# Patient Record
Sex: FEMALE | Race: WHITE | URBAN - METROPOLITAN AREA
[De-identification: names, ages, dates, MRNs, and addresses within clinical notes are randomized per-mention and may not be internally consistent; named-entity substitution may affect disease eponyms.]

---

## 2023-01-31 ENCOUNTER — WEB ENCOUNTER (OUTPATIENT)
Dept: URBAN - METROPOLITAN AREA CLINIC 19 | Facility: CLINIC | Age: 63
End: 2023-01-31

## 2023-02-02 ENCOUNTER — LAB OUTSIDE AN ENCOUNTER (OUTPATIENT)
Dept: URBAN - METROPOLITAN AREA CLINIC 19 | Facility: CLINIC | Age: 63
End: 2023-02-02

## 2023-02-02 ENCOUNTER — OFFICE VISIT (OUTPATIENT)
Dept: URBAN - METROPOLITAN AREA CLINIC 19 | Facility: CLINIC | Age: 63
End: 2023-02-02
Payer: SELF-PAY

## 2023-02-02 ENCOUNTER — WEB ENCOUNTER (OUTPATIENT)
Dept: URBAN - METROPOLITAN AREA CLINIC 19 | Facility: CLINIC | Age: 63
End: 2023-02-02

## 2023-02-02 DIAGNOSIS — N39.0 URINARY TRACT INFECTION WITHOUT HEMATURIA, SITE UNSPECIFIED: ICD-10-CM

## 2023-02-02 DIAGNOSIS — R10.32 LLQ ABDOMINAL PAIN: ICD-10-CM

## 2023-02-02 DIAGNOSIS — R30.0 DYSURIA: ICD-10-CM

## 2023-02-02 DIAGNOSIS — R19.7 DIARRHEA OF PRESUMED INFECTIOUS ORIGIN: ICD-10-CM

## 2023-02-02 PROCEDURE — 99204 OFFICE O/P NEW MOD 45 MIN: CPT | Performed by: NURSE PRACTITIONER

## 2023-02-02 RX ORDER — TOPIRAMATE 50 MG/1
1 TABLET TABLET, FILM COATED ORAL ONCE A DAY
Status: ACTIVE | COMMUNITY

## 2023-02-02 RX ORDER — ESTRADIOL 2 MG/1
1 TABLET TABLET ORAL ONCE A DAY
Status: ACTIVE | COMMUNITY

## 2023-02-02 RX ORDER — SULFAMETHOXAZOLE AND TRIMETHOPRIM 800; 160 MG/1; MG/1
1 TABLET TABLET ORAL TWICE A DAY
Qty: 14 TABLET | Refills: 0 | OUTPATIENT
Start: 2023-02-02 | End: 2023-02-09

## 2023-02-02 NOTE — PHYSICAL EXAM GASTROINTESTINAL
Abdomen, soft, LLQ tender, nondistended, normal bowel sounds, Liver and Spleen, no hepatomegaly present

## 2023-02-02 NOTE — HPI-TODAY'S VISIT:
62-year-old female presents to the office for abdominal pain nausea and diarrhea. She was seen in the emergency room 1/26/2023 for diarrhea x5 days, nausea, abdominal cramping. LABS  CBC normal except for slightly low WBC at 3.8, CMP normal, urine culture showed E. coli. She was not placed on antibiotics, was not informed she had a UTI. She complains of diarrhea that lasted 6 days, resolved at this time. Having 1 formed stool/day now. Denies blood in stools. She continues to have nausea intermittently for the last week that is improving. She complains of LLQ abdominal pain for 2 months, constant, dull ache, nothing makes it worse/better.  Denies GERD, dysphagia, wt loss.  Her urine does have a horrible odor.   HX H pylori 8 years ago Hx of parasites, ecoli while drinking well water 5 years ago Last Colonoscopy was 5 years ago, [facility], no polyps. Denies family hx of colon cancer.

## 2023-02-03 ENCOUNTER — LAB OUTSIDE AN ENCOUNTER (OUTPATIENT)
Dept: URBAN - METROPOLITAN AREA CLINIC 19 | Facility: CLINIC | Age: 63
End: 2023-02-03

## 2023-02-03 ENCOUNTER — TELEPHONE ENCOUNTER (OUTPATIENT)
Dept: URBAN - METROPOLITAN AREA CLINIC 19 | Facility: CLINIC | Age: 63
End: 2023-02-03

## 2023-02-09 LAB
ADENOVIRUS F 40/41: NOT DETECTED
CAMPYLOBACTER: NOT DETECTED
CLOSTRIDIUM DIFFICILE: NOT DETECTED
CRYPTOSPORIDIUM: NOT DETECTED
ENTAMOEBA HISTOLYTICA: NOT DETECTED
ENTEROAGGREGATIVE E.COLI: NOT DETECTED
ENTEROTOXIGENIC E.COLI: NOT DETECTED
ESCHERICHIA COLI O157: NOT DETECTED
GIARDIA LAMBLIA: NOT DETECTED
H. PYLORI (EIA) - QDX: NEGATIVE
NOROVIRUS GI/GII: NOT DETECTED
ROTAVIRUS A: NOT DETECTED
SALMONELLA SPP.: NOT DETECTED
SHIGA-LIKE TOXIN PRODUCING E.COLI: NOT DETECTED
SHIGELLA SPP. / ENTEROINVASIVE E.COLI: NOT DETECTED
VIBRIO PARAHAEMOLYTICUS: NOT DETECTED
VIBRIO SPP.: NOT DETECTED
YERSINIA ENTEROCOLITICA: NOT DETECTED

## 2023-02-17 ENCOUNTER — TELEPHONE ENCOUNTER (OUTPATIENT)
Dept: URBAN - METROPOLITAN AREA CLINIC 19 | Facility: CLINIC | Age: 63
End: 2023-02-17

## 2023-02-17 RX ORDER — HYOSCYAMINE SULFATE 0.12 MG/1
1 TABLET UNDER THE TONGUE AND ALLOW TO DISSOLVE  AS NEEDED TABLET SUBLINGUAL THREE TIMES A DAY
Qty: 60 | Refills: 2 | OUTPATIENT
Start: 2023-02-17 | End: 2023-05-18

## 2023-03-16 ENCOUNTER — OFFICE VISIT (OUTPATIENT)
Dept: URBAN - METROPOLITAN AREA CLINIC 19 | Facility: CLINIC | Age: 63
End: 2023-03-16

## 2023-04-21 ENCOUNTER — LAB OUTSIDE AN ENCOUNTER (OUTPATIENT)
Dept: URBAN - METROPOLITAN AREA CLINIC 96 | Facility: CLINIC | Age: 63
End: 2023-04-21

## 2023-04-21 ENCOUNTER — DASHBOARD ENCOUNTERS (OUTPATIENT)
Age: 63
End: 2023-04-21

## 2023-04-21 ENCOUNTER — OFFICE VISIT (OUTPATIENT)
Dept: URBAN - METROPOLITAN AREA CLINIC 96 | Facility: CLINIC | Age: 63
End: 2023-04-21
Payer: SELF-PAY

## 2023-04-21 VITALS
SYSTOLIC BLOOD PRESSURE: 118 MMHG | BODY MASS INDEX: 26.05 KG/M2 | HEIGHT: 63 IN | TEMPERATURE: 98.2 F | DIASTOLIC BLOOD PRESSURE: 67 MMHG | WEIGHT: 147 LBS

## 2023-04-21 DIAGNOSIS — R10.32 LLQ ABDOMINAL PAIN: ICD-10-CM

## 2023-04-21 DIAGNOSIS — E53.8 B12 DEFICIENCY: ICD-10-CM

## 2023-04-21 DIAGNOSIS — K59.00 CONSTIPATION, UNSPECIFIED CONSTIPATION TYPE: ICD-10-CM

## 2023-04-21 PROBLEM — 14760008: Status: ACTIVE | Noted: 2023-04-21

## 2023-04-21 PROCEDURE — 99214 OFFICE O/P EST MOD 30 MIN: CPT

## 2023-04-21 RX ORDER — TOPIRAMATE 50 MG/1
1 TABLET TABLET, FILM COATED ORAL ONCE A DAY
Status: ACTIVE | COMMUNITY

## 2023-04-21 RX ORDER — CYANOCOBALAMIN (VITAMIN B-12) 2000 MCG
1 TABLET TABLET ORAL ONCE A DAY
Status: ACTIVE | COMMUNITY

## 2023-04-21 RX ORDER — HYOSCYAMINE SULFATE 0.12 MG/1
1 TABLET UNDER THE TONGUE AND ALLOW TO DISSOLVE  AS NEEDED TABLET SUBLINGUAL THREE TIMES A DAY
Qty: 60 | Refills: 2 | Status: ACTIVE | COMMUNITY
Start: 2023-02-17 | End: 2023-05-18

## 2023-04-21 RX ORDER — ESTRADIOL 2 MG/1
1 TABLET TABLET ORAL ONCE A DAY
Status: ACTIVE | COMMUNITY

## 2023-04-21 NOTE — HPI-TODAY'S VISIT:
Previously in 2/2023 with Mahi Barclay, 62-year-old female presents to the office for abdominal pain nausea and diarrhea. She was seen in the emergency room 1/26/2023 for diarrhea x5 days, nausea, abdominal cramping. LABS  CBC normal except for slightly low WBC at 3.8, CMP normal, urine culture showed E. coli. She was not placed on antibiotics, was not informed she had a UTI. She complains of diarrhea that lasted 6 days, resolved at this time. Having 1 formed stool/day now. Denies blood in stools. She continues to have nausea intermittently for the last week that is improving. She complains of LLQ abdominal pain for 2 months, constant, dull ache, nothing makes it worse/better.  Denies GERD, dysphagia, wt loss.  Her urine does have a horrible odor.   HX H pylori 8 years ago Hx of parasites, ecoli while drinking well water 5 years ago Last Colonoscopy was 5 years ago, [facility], no polyps. Denies family hx of colon cancer. . Today on 4/21/2023, CT scan completed 2/16/2023.  Findings included no acute inflammatory process within the abdomen or pelvis.  Large amount of stool noted throughout the colon. Patient thought she was having neurological issues- had blood work and showed decreased b12. Started on sublingual B12 and she reports fatigue had not improved  Dr. Davis Aguillon neurologist recommended EGD to evaluate for possible pernicious anemia per patient Denies epigastric abdominal pain, nausea, vomiting or GERD  She states she wakes up tired and is exhausted all day long Having a BM daily- feels when she drank well water for 7 years that it "changed her gi track" Can intermittently have diarrhea and lower abdominal pain

## 2023-04-25 ENCOUNTER — WEB ENCOUNTER (OUTPATIENT)
Dept: URBAN - METROPOLITAN AREA CLINIC 96 | Facility: CLINIC | Age: 63
End: 2023-04-25

## 2023-04-25 ENCOUNTER — TELEPHONE ENCOUNTER (OUTPATIENT)
Dept: URBAN - METROPOLITAN AREA CLINIC 35 | Facility: CLINIC | Age: 63
End: 2023-04-25

## 2023-04-29 LAB
ABSOLUTE BASOPHILS: 20
ABSOLUTE EOSINOPHILS: 78
ABSOLUTE LYMPHOCYTES: 1573
ABSOLUTE MONOCYTES: 372
ABSOLUTE NEUTROPHILS: 2857
ANTIPARIETAL CELL ANTIBODY: <=20
BASOPHILS: 0.4
EOSINOPHILS: 1.6
FOLATE (FOLIC ACID), SERUM: 16.5
HEMATOCRIT: 37.6
HEMOGLOBIN: 13.2
INTRINSIC FACTOR BLOCKING: NEGATIVE
LYMPHOCYTES: 32.1
MCH: 33
MCHC: 35.1
MCV: 94
MONOCYTES: 7.6
MPV: 10.9
NEUTROPHILS: 58.3
PLATELET COUNT: 184
RDW: 12.5
RED BLOOD CELL COUNT: 4
VITAMIN B12: 407
WHITE BLOOD CELL COUNT: 4.9

## 2023-05-02 ENCOUNTER — WEB ENCOUNTER (OUTPATIENT)
Dept: URBAN - METROPOLITAN AREA CLINIC 96 | Facility: CLINIC | Age: 63
End: 2023-05-02

## 2023-05-02 RX ORDER — TOPIRAMATE 50 MG/1
1 TABLET TABLET, FILM COATED ORAL ONCE A DAY
Status: ACTIVE | COMMUNITY

## 2023-05-02 RX ORDER — HYOSCYAMINE SULFATE 0.12 MG/1
1 TABLET UNDER THE TONGUE AND ALLOW TO DISSOLVE  AS NEEDED TABLET SUBLINGUAL THREE TIMES A DAY
Qty: 60 | Refills: 2 | Status: ACTIVE | COMMUNITY
Start: 2023-02-17 | End: 2023-05-18

## 2023-05-02 RX ORDER — SODIUM, POTASSIUM,MAG SULFATES 17.5-3.13G
177 ML SOLUTION, RECONSTITUTED, ORAL ORAL AS DIRECTED
Qty: 1 KIT | Refills: 0 | OUTPATIENT
Start: 2023-05-02 | End: 2023-05-03

## 2023-05-02 RX ORDER — CYANOCOBALAMIN (VITAMIN B-12) 2000 MCG
1 TABLET TABLET ORAL ONCE A DAY
Status: ACTIVE | COMMUNITY

## 2023-05-02 RX ORDER — ESTRADIOL 2 MG/1
1 TABLET TABLET ORAL ONCE A DAY
Status: ACTIVE | COMMUNITY
